# Patient Record
Sex: FEMALE | Race: WHITE | Employment: UNEMPLOYED | URBAN - METROPOLITAN AREA
[De-identification: names, ages, dates, MRNs, and addresses within clinical notes are randomized per-mention and may not be internally consistent; named-entity substitution may affect disease eponyms.]

---

## 2019-05-03 ENCOUNTER — HOSPITAL ENCOUNTER (EMERGENCY)
Facility: CLINIC | Age: 59
Discharge: HOME | End: 2019-05-03
Attending: EMERGENCY MEDICINE | Admitting: EMERGENCY MEDICINE
Payer: COMMERCIAL

## 2019-05-03 VITALS
SYSTOLIC BLOOD PRESSURE: 132 MMHG | DIASTOLIC BLOOD PRESSURE: 82 MMHG | OXYGEN SATURATION: 100 % | RESPIRATION RATE: 16 BRPM | BODY MASS INDEX: 26.58 KG/M2 | HEIGHT: 63 IN | HEART RATE: 85 BPM | TEMPERATURE: 98.6 F | WEIGHT: 150 LBS

## 2019-05-03 DIAGNOSIS — J02.9 VIRAL PHARYNGITIS: ICD-10-CM

## 2019-05-03 DIAGNOSIS — M62.81 GENERALIZED MUSCLE WEAKNESS: ICD-10-CM

## 2019-05-03 LAB
DEPRECATED S PYO AG THROAT QL EIA: NORMAL
SPECIMEN SOURCE: NORMAL

## 2019-05-03 PROCEDURE — 96374 THER/PROPH/DIAG INJ IV PUSH: CPT

## 2019-05-03 PROCEDURE — 87081 CULTURE SCREEN ONLY: CPT | Performed by: EMERGENCY MEDICINE

## 2019-05-03 PROCEDURE — 96361 HYDRATE IV INFUSION ADD-ON: CPT

## 2019-05-03 PROCEDURE — 96375 TX/PRO/DX INJ NEW DRUG ADDON: CPT

## 2019-05-03 PROCEDURE — 87880 STREP A ASSAY W/OPTIC: CPT | Performed by: EMERGENCY MEDICINE

## 2019-05-03 PROCEDURE — 25000128 H RX IP 250 OP 636: Performed by: EMERGENCY MEDICINE

## 2019-05-03 PROCEDURE — 99284 EMERGENCY DEPT VISIT MOD MDM: CPT | Mod: 25

## 2019-05-03 RX ORDER — METOCLOPRAMIDE HYDROCHLORIDE 5 MG/ML
10 INJECTION INTRAMUSCULAR; INTRAVENOUS ONCE
Status: COMPLETED | OUTPATIENT
Start: 2019-05-03 | End: 2019-05-03

## 2019-05-03 RX ORDER — DEXAMETHASONE SODIUM PHOSPHATE 10 MG/ML
10 INJECTION, SOLUTION INTRAMUSCULAR; INTRAVENOUS ONCE
Status: COMPLETED | OUTPATIENT
Start: 2019-05-03 | End: 2019-05-03

## 2019-05-03 RX ORDER — DIPHENHYDRAMINE HYDROCHLORIDE 50 MG/ML
25 INJECTION INTRAMUSCULAR; INTRAVENOUS ONCE
Status: COMPLETED | OUTPATIENT
Start: 2019-05-03 | End: 2019-05-03

## 2019-05-03 RX ORDER — SODIUM CHLORIDE 9 MG/ML
1000 INJECTION, SOLUTION INTRAVENOUS CONTINUOUS
Status: DISCONTINUED | OUTPATIENT
Start: 2019-05-03 | End: 2019-05-03 | Stop reason: HOSPADM

## 2019-05-03 RX ADMIN — SODIUM CHLORIDE 1000 ML: 9 INJECTION, SOLUTION INTRAVENOUS at 20:13

## 2019-05-03 RX ADMIN — DEXAMETHASONE SODIUM PHOSPHATE 10 MG: 10 INJECTION, SOLUTION INTRAMUSCULAR; INTRAVENOUS at 20:13

## 2019-05-03 RX ADMIN — DIPHENHYDRAMINE HYDROCHLORIDE 25 MG: 50 INJECTION, SOLUTION INTRAMUSCULAR; INTRAVENOUS at 20:13

## 2019-05-03 RX ADMIN — METOCLOPRAMIDE 10 MG: 5 INJECTION, SOLUTION INTRAMUSCULAR; INTRAVENOUS at 20:13

## 2019-05-03 ASSESSMENT — ENCOUNTER SYMPTOMS
NAUSEA: 1
FEVER: 1
SORE THROAT: 1
VOMITING: 0
TROUBLE SWALLOWING: 0
SHORTNESS OF BREATH: 0
HEADACHES: 1
COUGH: 1
CHILLS: 1

## 2019-05-03 ASSESSMENT — MIFFLIN-ST. JEOR: SCORE: 1229.53

## 2019-05-03 NOTE — ED AVS SNAPSHOT
Emergency Department  [address] [place] [id number]  Phone:  [phone]  Fax:  [phone]                                    Pati Burns   MRN: [id number]    Department:   Emergency Department   Date of Visit:  5/3/2019           After Visit Summary Signature Page    I have received my discharge instructions, and my questions have been answered. I have discussed any challenges I see with this plan with the nurse or doctor.    ..........................................................................................................................................  Patient/Patient Representative Signature      ..........................................................................................................................................  Patient Representative Print Name and Relationship to Patient    ..................................................               ................................................  Date                                   Time    ..........................................................................................................................................  Reviewed by Signature/Title    ...................................................              ..............................................  Date                                               Time          22EPIC Rev 08/18

## 2019-05-04 NOTE — ED TRIAGE NOTES
Patient presents with sore throat x 3 days, fever and nasal congestion. Feeling weak and fatigued and unable to take food in. Also having abdominal pain. Patient thinks the abdominal pain is made worse with all of the ibuprofen she has been taking for throat pain.

## 2019-05-04 NOTE — ED PROVIDER NOTES
"  History     Chief Complaint:  Sore Throat, Fever    HPI   Pati Burns is an otherwise healthy 58 year old female who presents with a sore throat and fever. The patient reported that her symptoms began 4 days ago with a sore throat, dry cough and occasional chills. Today, she additionally woke up with a headache, a fever reaching 100.2F, and some nausea. The patient took ibuprofen, with no relief. Thus, she presented to the ED for evaluation. Here in the ED, she adds that she has no difficulty swallowing or shortness of breath. She has had no vomiting or other symptoms or concerns.    Allergies:  Azithromycin  Povidone iodine    Medications:    The patient is currently on no regular medications.    Past Medical History:    The patient denies any significant past medical history.    Past Surgical History:    The patient does not have any pertinent past surgical history.    Family History:    No past pertinent family history.    Social History:  Negative for tobacco use.  Negative for alcohol use.  Negative for drug use.  Marital Status:   [2]     Review of Systems   Constitutional: Positive for chills and fever.   HENT: Positive for sore throat. Negative for trouble swallowing.    Respiratory: Positive for cough. Negative for shortness of breath.    Gastrointestinal: Positive for nausea. Negative for vomiting.   Neurological: Positive for headaches.   All other systems reviewed and are negative.      Physical Exam     Patient Vitals for the past 24 hrs:   BP Temp Temp src Pulse Heart Rate Resp SpO2 Height Weight   05/03/19 2100 132/82 -- -- -- -- 16 100 % -- --   05/03/19 1903 147/86 98.6  F (37  C) Oral 85 85 14 100 % 1.6 m (5' 3\") 68 kg (150 lb)     Physical Exam  Eye:  Pupils are equal, round, and reactive.  Extraocular movements intact.    ENT:  Tympanic membranes are normal bilaterally.  + nasal congestion.  Moist mucus membranes.  Normal tongue and tonsil.    Cardiac:  Regular rate and rhythm.  No " murmurs, gallops, or rubs.    Pulmonary:  Clear to auscultation bilaterally.  No wheezes, rales, or rhonchi.  Infrequent dry cough without increased work of breathing.    Abdomen:  Positive bowel sounds.  Abdomen is soft and non-distended, without focal tenderness.    Musculoskeletal:  Normal movement of all extremities without evidence for deficit.    Skin:  Warm and dry without rashes.    Neurologic:  Non-focal exam without asymmetric weakness or numbness.    Psychiatric:  Normal affect with appropriate interaction.    Emergency Department Course     Laboratory:  Rapid strep: negative  Beta strep culture: pending    Interventions:  2013 Decadron 10 mg IV injection   Benadryl 25 mg IV injection   Reglan 10 mg IV injection   NS 1L IV    Emergency Department Course:  Nursing notes and vitals reviewed. (1909) I performed an exam of the patient as documented above.     IV placed. Rapid strep screen was sent, as noted above. Medications administered, as above.     (1945) I rechecked the patient and discussed the results of her workup thus far.     Findings and plan explained to the Patient. Patient discharged home with instructions regarding supportive care, medications, and reasons to return. The importance of close follow-up was reviewed.     I personally reviewed the laboratory results with the Patient and answered all related questions prior to discharge.     Impression & Plan      Medical Decision Making:  Pati Burns is a 58 year old female without significant past medical history presenting to us because of generalized malaise, sore throat, and fever secondary to a sore throat.  She does describe some nasal congestion and cough as well, though she denies any significant shortness of breath or productive cough.  Her  has been sick with very similar symptoms.    On exam, her vital signs are all normal.  She has no trismus and looking at the back of her throat shows no evidence of exudates, asymmetry or  "sign of peritonsillar abscess, or pooling of secretions.  Her lungs are clear.  A rapid strep was obtained which is negative.  The patient states she primarily came in today because \"I just want to feel better.\"  Therefore, she was given IV fluids along with steroids and medications for an ongoing headache that she is been dealing with.  She feels markedly improved by the end of these treatment regimens.  Considering the upper respiratory symptoms associated with this along with a lack of fever, neurologic deficits, or meningismus, I do not believe this is likely to represent viral meningitis and I do not believe that a lumbar puncture is indicated.  She will continue with anti-inflammatories and will follow-up with her primary doctor on Monday if not markedly improved.  She was invited back to our facility at any point for worsening of condition or other emergent concerns.    Diagnosis:    ICD-10-CM    1. Viral pharyngitis J02.9    2. Generalized muscle weakness M62.81      Disposition:  discharged to home    Scribe Disclosure:  I, Lenore Suggs, am serving as a scribe on 5/3/2019 at 7:09 PM to personally document services performed by Trierweiler, Chad A, MD based on my observations and the provider's statements to me.     Lenore Suggs  5/3/2019    EMERGENCY DEPARTMENT       Trierweiler, Chad A, MD  05/04/19 1316    "

## 2019-05-05 LAB
BACTERIA SPEC CULT: NORMAL
Lab: NORMAL
SPECIMEN SOURCE: NORMAL

## 2024-06-03 ENCOUNTER — LAB REQUISITION (OUTPATIENT)
Dept: LAB | Facility: CLINIC | Age: 64
End: 2024-06-03
Payer: COMMERCIAL

## 2024-06-03 DIAGNOSIS — D48.5 NEOPLASM OF UNCERTAIN BEHAVIOR OF SKIN: ICD-10-CM

## 2024-06-03 PROCEDURE — 88305 TISSUE EXAM BY PATHOLOGIST: CPT | Mod: TC,ORL | Performed by: DERMATOLOGY

## 2024-06-03 PROCEDURE — 88305 TISSUE EXAM BY PATHOLOGIST: CPT | Mod: 26 | Performed by: DERMATOLOGY

## 2024-06-05 LAB
PATH REPORT.COMMENTS IMP SPEC: ABNORMAL
PATH REPORT.COMMENTS IMP SPEC: ABNORMAL
PATH REPORT.COMMENTS IMP SPEC: YES
PATH REPORT.FINAL DX SPEC: ABNORMAL
PATH REPORT.GROSS SPEC: ABNORMAL
PATH REPORT.MICROSCOPIC SPEC OTHER STN: ABNORMAL
PATH REPORT.RELEVANT HX SPEC: ABNORMAL